# Patient Record
Sex: MALE | Race: WHITE | Employment: UNEMPLOYED | URBAN - METROPOLITAN AREA
[De-identification: names, ages, dates, MRNs, and addresses within clinical notes are randomized per-mention and may not be internally consistent; named-entity substitution may affect disease eponyms.]

---

## 2024-08-07 ENCOUNTER — APPOINTMENT (OUTPATIENT)
Dept: GENERAL RADIOLOGY | Facility: HOSPITAL | Age: 29
End: 2024-08-07
Attending: STUDENT IN AN ORGANIZED HEALTH CARE EDUCATION/TRAINING PROGRAM
Payer: MEDICAID

## 2024-08-07 ENCOUNTER — HOSPITAL ENCOUNTER (EMERGENCY)
Facility: HOSPITAL | Age: 29
Discharge: HOME | End: 2024-08-07
Attending: STUDENT IN AN ORGANIZED HEALTH CARE EDUCATION/TRAINING PROGRAM
Payer: MEDICAID

## 2024-08-07 VITALS
RESPIRATION RATE: 18 BRPM | DIASTOLIC BLOOD PRESSURE: 93 MMHG | TEMPERATURE: 98 F | HEART RATE: 67 BPM | WEIGHT: 205 LBS | OXYGEN SATURATION: 99 % | SYSTOLIC BLOOD PRESSURE: 145 MMHG

## 2024-08-07 DIAGNOSIS — S42.001A CLOSED DISPLACED FRACTURE OF RIGHT CLAVICLE, UNSPECIFIED PART OF CLAVICLE, INITIAL ENCOUNTER: Primary | ICD-10-CM

## 2024-08-07 PROCEDURE — 73000 X-RAY EXAM OF COLLAR BONE: CPT | Performed by: STUDENT IN AN ORGANIZED HEALTH CARE EDUCATION/TRAINING PROGRAM

## 2024-08-07 PROCEDURE — 99284 EMERGENCY DEPT VISIT MOD MDM: CPT

## 2024-08-07 RX ORDER — HYDROCODONE BITARTRATE AND ACETAMINOPHEN 5; 325 MG/1; MG/1
1-2 TABLET ORAL EVERY 6 HOURS PRN
Qty: 10 TABLET | Refills: 0 | Status: SHIPPED | OUTPATIENT
Start: 2024-08-07 | End: 2024-08-09

## 2024-08-07 RX ORDER — HYDROCODONE BITARTRATE AND ACETAMINOPHEN 5; 325 MG/1; MG/1
1 TABLET ORAL ONCE
Status: COMPLETED | OUTPATIENT
Start: 2024-08-07 | End: 2024-08-07

## 2024-08-07 NOTE — DISCHARGE INSTRUCTIONS
Follow-up care  Follow up with your healthcare provider as advised. This is to be sure the bone is healing the way it should.   X-rays are occasionally taken of the fracture. You'll be told of any new findings that may affect your care.   When to get medical care  Call your healthcare provider right away if any of these occur:  Swelling in your collarbone gets worse or the skin in the area becomes pale or discolored  Large area of bruising over the collarbone  Fingers become swollen, cold, blue, numb, or tingly  Shortness of breath, dizziness, or general weakness  Weakness or swelling in your arm  Any redness, drainage, or pus coming from the wound
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
## 2024-08-08 ENCOUNTER — HOSPITAL ENCOUNTER (EMERGENCY)
Facility: HOSPITAL | Age: 29
Discharge: LEFT BEFORE DISCHARGE | End: 2024-08-08
Payer: MEDICAID

## 2024-08-08 ENCOUNTER — TELEPHONE (OUTPATIENT)
Dept: ORTHOPEDICS CLINIC | Facility: CLINIC | Age: 29
End: 2024-08-08

## 2024-08-08 ENCOUNTER — OFFICE VISIT (OUTPATIENT)
Dept: ORTHOPEDICS CLINIC | Facility: CLINIC | Age: 29
End: 2024-08-08
Payer: MEDICAID

## 2024-08-08 ENCOUNTER — HOSPITAL ENCOUNTER (OUTPATIENT)
Dept: GENERAL RADIOLOGY | Age: 29
Discharge: HOME | End: 2024-08-08
Attending: FAMILY MEDICINE
Payer: MEDICAID

## 2024-08-08 VITALS
SYSTOLIC BLOOD PRESSURE: 151 MMHG | DIASTOLIC BLOOD PRESSURE: 103 MMHG | RESPIRATION RATE: 16 BRPM | OXYGEN SATURATION: 96 % | HEART RATE: 61 BPM | TEMPERATURE: 98 F

## 2024-08-08 VITALS — WEIGHT: 205 LBS | BODY MASS INDEX: 25.49 KG/M2 | HEIGHT: 75 IN

## 2024-08-08 DIAGNOSIS — S42.021A CLOSED DISPLACED FRACTURE OF SHAFT OF RIGHT CLAVICLE, INITIAL ENCOUNTER: Primary | ICD-10-CM

## 2024-08-08 DIAGNOSIS — M89.8X1 PAIN OF RIGHT CLAVICLE: ICD-10-CM

## 2024-08-08 DIAGNOSIS — M89.8X1 PAIN OF RIGHT CLAVICLE: Primary | ICD-10-CM

## 2024-08-08 PROCEDURE — 73000 X-RAY EXAM OF COLLAR BONE: CPT | Performed by: FAMILY MEDICINE

## 2024-08-08 RX ORDER — ALBUTEROL SULFATE 90 UG/1
1 AEROSOL, METERED RESPIRATORY (INHALATION) EVERY 6 HOURS PRN
COMMUNITY

## 2024-08-08 RX ORDER — HYDROCODONE BITARTRATE AND ACETAMINOPHEN 2.5; 108 MG/5ML; MG/5ML
SOLUTION ORAL AS DIRECTED
COMMUNITY
End: 2024-08-12

## 2024-08-08 RX ORDER — PREDNISONE 20 MG/1
40 TABLET ORAL DAILY
Qty: 14 TABLET | Refills: 0 | Status: SHIPPED | OUTPATIENT
Start: 2024-08-08 | End: 2024-08-15
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
## 2024-08-08 NOTE — TELEPHONE ENCOUNTER
Do you want new images for appointment 8/14/24?  Please advise. Thank you!    DOI 8/7/24    XR 8/7/24    Impression   CONCLUSION:     Mildly displaced midclavicular fracture with up to 1.9 cm of displacement.  The acromioclavicular joint is intact.     The remaining osseous structures are unremarkable.     Visualized portions of the lungs are unremarkable.

## 2024-08-08 NOTE — CM/SW NOTE
Mother called for assistance with follow up appointment  On AVS states to call Dr Goodman for follow up  Per mom she called Dr Haro  Office stated \"Dr Thibodeaux was not on call on 8/7 when patient was seen in the Er and we will not take any information\"    Called Dr Beherys office they will message the clinical staff to see if Dr Behery can see the patient  Dr Behery is out of network with insurance    Called Dr Ryan office they will call the clinical team to see if they can get sooner appointment    Called [facility] [phone] appt made with Dr Wright 8/14 at 3:30    Called Kelly Huntley patient needs referral from primary care MD, per mom patient has not seen a PCP, [place]    Mom called insurance and received closer PCP tried to get appointment for referral but mom states would have to wait 4 hours in the office to see provider. Mom states patient is in excruciating pain and arm is turning numb  Mom will bring patient back to the Er to be reevaluated by an ERMD    Patient in Er triage and mom received called from DR Kyle erickson who stated for patient to come to office at this time    Mom and ruelt left to go to Dr Wright's office

## 2024-08-08 NOTE — TELEPHONE ENCOUNTER
Per Kisha patient has a right clavicle fracture and asking if patient can be seen sooner than first available 8/19. Per Kisha please call patient at [phone] to schedule. Thank you
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
## 2024-08-08 NOTE — TELEPHONE ENCOUNTER
Spoke with patient who transferred me to his mom. I let her know that he had an appointment scheduled on 8/14 in [place] with [facility]. We have nothing until 8/15 open and advised to call around to see if patient can be seen sooner than next Wednesday. Gave number for Augustine. Advised to call back if cannot get in sooner and to call to cancel appointment scheduled with [facility] if they can
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
## 2024-08-09 ENCOUNTER — OFFICE VISIT (OUTPATIENT)
Dept: ORTHOPEDICS CLINIC | Facility: CLINIC | Age: 29
End: 2024-08-09
Payer: MEDICAID

## 2024-08-09 ENCOUNTER — TELEPHONE (OUTPATIENT)
Dept: ORTHOPEDICS CLINIC | Facility: CLINIC | Age: 29
End: 2024-08-09

## 2024-08-09 VITALS — HEIGHT: 75 IN | WEIGHT: 205 LBS | BODY MASS INDEX: 25.49 KG/M2

## 2024-08-09 DIAGNOSIS — S42.021A CLOSED DISPLACED FRACTURE OF SHAFT OF RIGHT CLAVICLE, INITIAL ENCOUNTER: Primary | ICD-10-CM

## 2024-08-09 DIAGNOSIS — S42.001A CLOSED DISPLACED FRACTURE OF RIGHT CLAVICLE, UNSPECIFIED PART OF CLAVICLE, INITIAL ENCOUNTER: ICD-10-CM

## 2024-08-09 RX ORDER — HYDROCODONE BITARTRATE AND ACETAMINOPHEN 5; 325 MG/1; MG/1
1-2 TABLET ORAL EVERY 6 HOURS PRN
Qty: 15 TABLET | Refills: 0 | Status: SHIPPED | OUTPATIENT
Start: 2024-08-09 | End: 2024-08-14

## 2024-08-09 NOTE — PROGRESS NOTES
Future Appointments   Date Time Provider Department Center   8/9/2024 11:20 AM [email or web address]
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
## 2024-08-09 NOTE — TELEPHONE ENCOUNTER
Spoke with Mariah to let lucio know that  the prior auth was sent this morning at 11:53 and we just got a notification at 1:46 the the prior auth has begun.

## 2024-08-09 NOTE — TELEPHONE ENCOUNTER
Patient's Mother Mariah called to request a prior auth for the [place] [facility]. Received the fax.

## 2024-08-12 ENCOUNTER — TELEPHONE (OUTPATIENT)
Dept: ORTHOPEDICS CLINIC | Facility: CLINIC | Age: 29
End: 2024-08-12

## 2024-08-12 ENCOUNTER — ANESTHESIA EVENT (OUTPATIENT)
Dept: SURGERY | Facility: HOSPITAL | Age: 29
End: 2024-08-12
Payer: MEDICAID

## 2024-08-12 ENCOUNTER — OFFICE VISIT (OUTPATIENT)
Dept: ORTHOPEDICS CLINIC | Facility: CLINIC | Age: 29
End: 2024-08-12
Payer: MEDICAID

## 2024-08-12 VITALS
DIASTOLIC BLOOD PRESSURE: 81 MMHG | HEART RATE: 58 BPM | BODY MASS INDEX: 25.49 KG/M2 | HEIGHT: 75 IN | WEIGHT: 205 LBS | SYSTOLIC BLOOD PRESSURE: 124 MMHG

## 2024-08-12 DIAGNOSIS — S42.021A DISPLACED FRACTURE OF SHAFT OF RIGHT CLAVICLE, INITIAL ENCOUNTER FOR CLOSED FRACTURE: Primary | ICD-10-CM

## 2024-08-12 DIAGNOSIS — S42.021A CLOSED DISPLACED FRACTURE OF SHAFT OF RIGHT CLAVICLE, INITIAL ENCOUNTER: Primary | ICD-10-CM
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
## 2024-08-12 NOTE — TELEPHONE ENCOUNTER
Date of Surgery: 8/13/24       Post Op Appt: 8/28/24 @ 1240    Case ID: [id number]    Notes:       SURGERY SCHEDULING SHEET     Delmar Young  1/13/1995  [id number]     Procedure: Right ORIF clavicle (07151)     Diagnosis:  Closed displaced fracture of shaft of right clavicle, initial encounter [S42.021A]     Anesthesia: General     Length of Surgery: 2 hrs     Disposition: Outpatient     Special Equipment: Aakash ALPS clavicle plate      Positioning: soft beach chair normal bed      Assist: Yes SK PAANSELMO     Pre-op Testing: PER ANESTHESIA GUIDELINES     Clearance: HISTORY AND PHYSICAL      Post op: 2 weeks post op     Anna Erickson MD  [facility] Orthopedic Surgery  Phone: [phone]  Fax: [phone]
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
## 2024-08-12 NOTE — TELEPHONE ENCOUNTER
SURGERY SCHEDULING SHEET    Delmar Young  1/13/1995  [id number]    Procedure: Right ORIF clavicle (26378)    Diagnosis:  Closed displaced fracture of shaft of right clavicle, initial encounter [S42.021A]    Anesthesia: General    Length of Surgery: 2 hrs    Disposition: Outpatient    Special Equipment: Aakash ALPS clavicle plate     Positioning: soft beach chair normal bed     Assist: Yes MARIEL DASH    Pre-op Testing: PER ANESTHESIA GUIDELINES    Clearance: HISTORY AND PHYSICAL     Post op: 2 weeks post op    Anna Erickson MD  [facility] Orthopedic Surgery  Phone: [phone]  Fax: [phone]

## 2024-08-13 ENCOUNTER — HOSPITAL ENCOUNTER (OUTPATIENT)
Facility: HOSPITAL | Age: 29
Setting detail: HOSPITAL OUTPATIENT SURGERY
Discharge: HOME | End: 2024-08-13
Attending: ORTHOPAEDIC SURGERY | Admitting: ORTHOPAEDIC SURGERY
Payer: MEDICAID

## 2024-08-13 ENCOUNTER — APPOINTMENT (OUTPATIENT)
Dept: GENERAL RADIOLOGY | Facility: HOSPITAL | Age: 29
End: 2024-08-13
Attending: ORTHOPAEDIC SURGERY
Payer: MEDICAID

## 2024-08-13 ENCOUNTER — ANESTHESIA (OUTPATIENT)
Dept: SURGERY | Facility: HOSPITAL | Age: 29
End: 2024-08-13
Payer: MEDICAID

## 2024-08-13 VITALS
HEIGHT: 75 IN | WEIGHT: 198 LBS | BODY MASS INDEX: 24.62 KG/M2 | RESPIRATION RATE: 16 BRPM | DIASTOLIC BLOOD PRESSURE: 85 MMHG | SYSTOLIC BLOOD PRESSURE: 132 MMHG | OXYGEN SATURATION: 100 % | TEMPERATURE: 98 F | HEART RATE: 77 BPM

## 2024-08-13 DIAGNOSIS — S42.021A CLOSED DISPLACED FRACTURE OF SHAFT OF RIGHT CLAVICLE, INITIAL ENCOUNTER: Primary | ICD-10-CM

## 2024-08-13 PROCEDURE — 0PS904Z REPOSITION RIGHT CLAVICLE WITH INTERNAL FIXATION DEVICE, OPEN APPROACH: ICD-10-PCS | Performed by: ORTHOPAEDIC SURGERY

## 2024-08-13 PROCEDURE — 76000 FLUOROSCOPY <1 HR PHYS/QHP: CPT | Performed by: ORTHOPAEDIC SURGERY

## 2024-08-13 DEVICE — IMPLANTABLE DEVICE: Type: IMPLANTABLE DEVICE | Site: CLAVICLE | Status: FUNCTIONAL

## 2024-08-13 DEVICE — IMPLANTABLE DEVICE: Type: IMPLANTABLE DEVICE | Site: CLAVICLE

## 2024-08-13 RX ORDER — SCOLOPAMINE TRANSDERMAL SYSTEM 1 MG/1
1 PATCH, EXTENDED RELEASE TRANSDERMAL ONCE
Status: DISCONTINUED | OUTPATIENT
Start: 2024-08-13 | End: 2024-08-13 | Stop reason: HOSPADM

## 2024-08-13 RX ORDER — GLYCOPYRROLATE 0.2 MG/ML
INJECTION, SOLUTION INTRAMUSCULAR; INTRAVENOUS AS NEEDED
Status: DISCONTINUED | OUTPATIENT
Start: 2024-08-13 | End: 2024-08-13 | Stop reason: SURG

## 2024-08-13 RX ORDER — ACETAMINOPHEN 500 MG
1000 TABLET ORAL ONCE AS NEEDED
Status: COMPLETED | OUTPATIENT
Start: 2024-08-13 | End: 2024-08-13

## 2024-08-13 RX ORDER — HYDRALAZINE HYDROCHLORIDE 20 MG/ML
INJECTION INTRAMUSCULAR; INTRAVENOUS AS NEEDED
Status: DISCONTINUED | OUTPATIENT
Start: 2024-08-13 | End: 2024-08-13 | Stop reason: SURG

## 2024-08-13 RX ORDER — SODIUM CHLORIDE, SODIUM LACTATE, POTASSIUM CHLORIDE, CALCIUM CHLORIDE 600; 310; 30; 20 MG/100ML; MG/100ML; MG/100ML; MG/100ML
INJECTION, SOLUTION INTRAVENOUS CONTINUOUS
Status: DISCONTINUED | OUTPATIENT
Start: 2024-08-13 | End: 2024-08-13

## 2024-08-13 RX ORDER — HYDROCODONE BITARTRATE AND ACETAMINOPHEN 5; 325 MG/1; MG/1
1 TABLET ORAL
Qty: 30 TABLET | Refills: 0 | Status: SHIPPED | OUTPATIENT
Start: 2024-08-13 | End: 2024-08-14

## 2024-08-13 RX ORDER — NEOSTIGMINE METHYLSULFATE 1 MG/ML
INJECTION INTRAVENOUS AS NEEDED
Status: DISCONTINUED | OUTPATIENT
Start: 2024-08-13 | End: 2024-08-13 | Stop reason: SURG

## 2024-08-13 RX ORDER — HYDROCODONE BITARTRATE AND ACETAMINOPHEN 5; 325 MG/1; MG/1
TABLET ORAL
Status: COMPLETED
Start: 2024-08-13 | End: 2024-08-13

## 2024-08-13 RX ORDER — HYDROCODONE BITARTRATE AND ACETAMINOPHEN 5; 325 MG/1; MG/1
2 TABLET ORAL ONCE AS NEEDED
Status: COMPLETED | OUTPATIENT
Start: 2024-08-13 | End: 2024-08-13

## 2024-08-13 RX ORDER — ACETAMINOPHEN 500 MG
1000 TABLET ORAL ONCE
Status: DISCONTINUED | OUTPATIENT
Start: 2024-08-13 | End: 2024-08-13 | Stop reason: HOSPADM

## 2024-08-13 RX ORDER — ONDANSETRON 2 MG/ML
INJECTION INTRAMUSCULAR; INTRAVENOUS
Status: DISCONTINUED
Start: 2024-08-13 | End: 2024-08-13 | Stop reason: WASHOUT

## 2024-08-13 RX ORDER — PROCHLORPERAZINE EDISYLATE 5 MG/ML
5 INJECTION INTRAMUSCULAR; INTRAVENOUS EVERY 8 HOURS PRN
Status: DISCONTINUED | OUTPATIENT
Start: 2024-08-13 | End: 2024-08-13

## 2024-08-13 RX ORDER — LIDOCAINE HYDROCHLORIDE 10 MG/ML
INJECTION, SOLUTION EPIDURAL; INFILTRATION; INTRACAUDAL; PERINEURAL AS NEEDED
Status: DISCONTINUED | OUTPATIENT
Start: 2024-08-13 | End: 2024-08-13 | Stop reason: SURG

## 2024-08-13 RX ORDER — MIDAZOLAM HYDROCHLORIDE 1 MG/ML
INJECTION INTRAMUSCULAR; INTRAVENOUS AS NEEDED
Status: DISCONTINUED | OUTPATIENT
Start: 2024-08-13 | End: 2024-08-13 | Stop reason: SURG

## 2024-08-13 RX ORDER — HYDROMORPHONE HYDROCHLORIDE 1 MG/ML
INJECTION, SOLUTION INTRAMUSCULAR; INTRAVENOUS; SUBCUTANEOUS
Status: COMPLETED
Start: 2024-08-13 | End: 2024-08-13

## 2024-08-13 RX ORDER — ONDANSETRON 2 MG/ML
INJECTION INTRAMUSCULAR; INTRAVENOUS AS NEEDED
Status: DISCONTINUED | OUTPATIENT
Start: 2024-08-13 | End: 2024-08-13 | Stop reason: SURG

## 2024-08-13 RX ORDER — LABETALOL HYDROCHLORIDE 5 MG/ML
5 INJECTION, SOLUTION INTRAVENOUS EVERY 5 MIN PRN
Status: DISCONTINUED | OUTPATIENT
Start: 2024-08-13 | End: 2024-08-13

## 2024-08-13 RX ORDER — ONDANSETRON 2 MG/ML
4 INJECTION INTRAMUSCULAR; INTRAVENOUS EVERY 6 HOURS PRN
Status: DISCONTINUED | OUTPATIENT
Start: 2024-08-13 | End: 2024-08-13

## 2024-08-13 RX ORDER — HYDROMORPHONE HYDROCHLORIDE 1 MG/ML
0.6 INJECTION, SOLUTION INTRAMUSCULAR; INTRAVENOUS; SUBCUTANEOUS EVERY 5 MIN PRN
Status: DISCONTINUED | OUTPATIENT
Start: 2024-08-13 | End: 2024-08-13

## 2024-08-13 RX ORDER — HYDROMORPHONE HYDROCHLORIDE 1 MG/ML
0.2 INJECTION, SOLUTION INTRAMUSCULAR; INTRAVENOUS; SUBCUTANEOUS EVERY 5 MIN PRN
Status: DISCONTINUED | OUTPATIENT
Start: 2024-08-13 | End: 2024-08-13

## 2024-08-13 RX ORDER — ROCURONIUM BROMIDE 10 MG/ML
INJECTION, SOLUTION INTRAVENOUS AS NEEDED
Status: DISCONTINUED | OUTPATIENT
Start: 2024-08-13 | End: 2024-08-13 | Stop reason: SURG

## 2024-08-13 RX ORDER — HYDROCODONE BITARTRATE AND ACETAMINOPHEN 5; 325 MG/1; MG/1
1 TABLET ORAL ONCE AS NEEDED
Status: COMPLETED | OUTPATIENT
Start: 2024-08-13 | End: 2024-08-13

## 2024-08-13 RX ORDER — NALOXONE HYDROCHLORIDE 0.4 MG/ML
0.08 INJECTION, SOLUTION INTRAMUSCULAR; INTRAVENOUS; SUBCUTANEOUS AS NEEDED
Status: DISCONTINUED | OUTPATIENT
Start: 2024-08-13 | End: 2024-08-13

## 2024-08-13 RX ORDER — BUPIVACAINE HYDROCHLORIDE 2.5 MG/ML
INJECTION, SOLUTION EPIDURAL; INFILTRATION; INTRACAUDAL AS NEEDED
Status: DISCONTINUED | OUTPATIENT
Start: 2024-08-13 | End: 2024-08-13 | Stop reason: HOSPADM

## 2024-08-13 RX ORDER — HYDROMORPHONE HYDROCHLORIDE 1 MG/ML
0.4 INJECTION, SOLUTION INTRAMUSCULAR; INTRAVENOUS; SUBCUTANEOUS EVERY 5 MIN PRN
Status: DISCONTINUED | OUTPATIENT
Start: 2024-08-13 | End: 2024-08-13

## 2024-08-13 RX ORDER — TRANEXAMIC ACID 10 MG/ML
INJECTION, SOLUTION INTRAVENOUS AS NEEDED
Status: DISCONTINUED | OUTPATIENT
Start: 2024-08-13 | End: 2024-08-13 | Stop reason: SURG

## 2024-08-13 RX ADMIN — SODIUM CHLORIDE, SODIUM LACTATE, POTASSIUM CHLORIDE, CALCIUM CHLORIDE: 600; 310; 30; 20 INJECTION, SOLUTION INTRAVENOUS at 10:51:00

## 2024-08-13 RX ADMIN — SODIUM CHLORIDE, SODIUM LACTATE, POTASSIUM CHLORIDE, CALCIUM CHLORIDE: 600; 310; 30; 20 INJECTION, SOLUTION INTRAVENOUS at 08:34:00

## 2024-08-13 RX ADMIN — ROCURONIUM BROMIDE 10 MG: 10 INJECTION, SOLUTION INTRAVENOUS at 11:22:00

## 2024-08-13 RX ADMIN — TRANEXAMIC ACID 1000 MG: 10 INJECTION, SOLUTION INTRAVENOUS at 09:52:00

## 2024-08-13 RX ADMIN — SODIUM CHLORIDE, SODIUM LACTATE, POTASSIUM CHLORIDE, CALCIUM CHLORIDE: 600; 310; 30; 20 INJECTION, SOLUTION INTRAVENOUS at 12:20:00

## 2024-08-13 RX ADMIN — GLYCOPYRROLATE 0.8 MG: 0.2 INJECTION, SOLUTION INTRAMUSCULAR; INTRAVENOUS at 12:03:00

## 2024-08-13 RX ADMIN — HYDRALAZINE HYDROCHLORIDE 5 MG: 20 INJECTION INTRAMUSCULAR; INTRAVENOUS at 10:03:00

## 2024-08-13 RX ADMIN — HYDRALAZINE HYDROCHLORIDE 5 MG: 20 INJECTION INTRAMUSCULAR; INTRAVENOUS at 09:56:00

## 2024-08-13 RX ADMIN — ROCURONIUM BROMIDE 10 MG: 10 INJECTION, SOLUTION INTRAVENOUS at 10:10:00

## 2024-08-13 RX ADMIN — ROCURONIUM BROMIDE 10 MG: 10 INJECTION, SOLUTION INTRAVENOUS at 10:51:00

## 2024-08-13 RX ADMIN — MIDAZOLAM HYDROCHLORIDE 2 MG: 1 INJECTION INTRAMUSCULAR; INTRAVENOUS at 08:38:00

## 2024-08-13 RX ADMIN — ROCURONIUM BROMIDE 50 MG: 10 INJECTION, SOLUTION INTRAVENOUS at 08:48:00

## 2024-08-13 RX ADMIN — NEOSTIGMINE METHYLSULFATE 4 MG: 1 INJECTION INTRAVENOUS at 12:03:00

## 2024-08-13 RX ADMIN — LIDOCAINE HYDROCHLORIDE 50 MG: 10 INJECTION, SOLUTION EPIDURAL; INFILTRATION; INTRACAUDAL; PERINEURAL at 08:46:00

## 2024-08-13 RX ADMIN — ROCURONIUM BROMIDE 20 MG: 10 INJECTION, SOLUTION INTRAVENOUS at 10:27:00

## 2024-08-13 RX ADMIN — ONDANSETRON 4 MG: 2 INJECTION INTRAMUSCULAR; INTRAVENOUS at 11:34:00
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
## 2024-08-13 NOTE — ANESTHESIA PREPROCEDURE EVALUATION
PRE-OP EVALUATION    Patient Name: Delmar Young    Admit Diagnosis: Closed displaced fracture of shaft of right clavicle, initial encounter [S42.021A]    Pre-op Diagnosis: Closed displaced fracture of shaft of right clavicle, initial encounter [S42.021A]    OPEN REDUCTION INTERNAL FIXATION OF RIGHT CLAVICLE    Anesthesia Procedure: OPEN REDUCTION INTERNAL FIXATION OF RIGHT CLAVICLE (Right)    Surgeons and Role:     * Anna Erickson MD - Primary    Pre-op vitals reviewed.  Temp: 97.8 °F (36.6 °C)  Pulse: 53  Resp: 16  BP: 106/56  SpO2: 97 %  Body mass index is 24.75 kg/m².    Current medications reviewed.  Hospital Medications:   [Transfer Hold] acetaminophen (Tylenol Extra Strength) tab 1,000 mg  1,000 mg Oral Once    [Transfer Hold] scopolamine (Transderm-Scop) 1 MG/3DAYS patch 1 patch  1 patch Transdermal Once    lactated ringers infusion   Intravenous Continuous    ceFAZolin (Ancef) 2g in 10mL IV syringe premix  2 g Intravenous Once       Outpatient Medications:     Medications Prior to Admission   Medication Sig Dispense Refill Last Dose    HYDROcodone-acetaminophen 5-325 MG Oral Tab Take 1-2 tablets by mouth every 6 (six) hours as needed for Pain. 15 tablet 0 8/12/2024    predniSONE 20 MG Oral Tab Take 2 tablets (40 mg total) by mouth daily for 7 days. 14 tablet 0 Past Week    albuterol 108 (90 Base) MCG/ACT Inhalation Aero Soln Inhale 1 puff into the lungs every 6 (six) hours as needed.   More than a month       Allergies: Patient has no known allergies.      Anesthesia Evaluation        Anesthetic Complications           GI/Hepatic/Renal                                 Cardiovascular    Negative cardiovascular ROS.        MET: >4                                           Endo/Other                                  Pulmonary    Negative pulmonary ROS.                       Neuro/Psych                                      History reviewed. No pertinent surgical history.  Social History     Socioeconomic  History    Marital status: Single   Tobacco Use    Smoking status: Never    Smokeless tobacco: Never   Vaping Use    Vaping status: Never Used   Substance and Sexual Activity    Alcohol use: Never    Drug use: Yes     Types: Cannabis     Comment: SMOKE 1/DAY     History   Drug Use    Types: Cannabis     Comment: SMOKE 1/DAY     Available pre-op labs reviewed.               Airway      Mallampati: II  Mouth opening: 3 FB  TM distance: 4 - 6 cm  Neck ROM: limited Cardiovascular    Cardiovascular exam normal.         Dental    Dentition appears grossly intact         Pulmonary    Pulmonary exam normal.                 Other findings              ASA: 2   Plan: general  NPO status verified and     Post-procedure pain management plan discussed with surgeon and patient.      Plan/risks discussed with: patient            Limited ROM due to pain (sharp at the clavicle level when extending and lateral rotating the neck. Will Plan for GA and use glidescope to avoid any neck extension.   Plan for GA Risks/benefits/alternatives were discussed and the consent was signed. All questions answered    Present on Admission:  **None**

## 2024-08-13 NOTE — INTERVAL H&P NOTE
Pre-op Diagnosis: Closed displaced fracture of shaft of right clavicle, initial encounter [S42.021A]    The above referenced H&P was reviewed by Anna Erickson MD on 8/13/2024, the patient was examined and no significant changes have occurred in the patient's condition since the H&P was performed.  I discussed with the patient and/or legal representative the potential benefits, risks and side effects of this procedure; the likelihood of the patient achieving goals; and potential problems that might occur during recuperation.  I discussed reasonable alternatives to the procedure, including risks, benefits and side effects related to the alternatives and risks related to not receiving this procedure.  We will proceed with procedure as planned.

## 2024-08-13 NOTE — BRIEF OP NOTE
Pre-Operative Diagnosis: Closed displaced fracture of shaft of right clavicle, initial encounter [S42.021A]     Post-Operative Diagnosis: Closed displaced fracture of shaft of right clavicle, initial encounter [S42.021A]      Procedure Performed:   OPEN REDUCTION INTERNAL FIXATION OF RIGHT CLAVICLE    Surgeons and Role:     * Anna Erickson MD - Primary    Assistant(s):  PA: Lesa Mccall PA-C     Surgical Findings: see operative report     Specimen: na      Estimated Blood Loss: Blood Output: 50 mL (8/13/2024 11:55 AM)      Lesa Mccall PA-C  8/13/2024  12:21 PM

## 2024-08-13 NOTE — DISCHARGE INSTRUCTIONS
Post-operative Instructions    Medication: Before you leave the hospital, you will be given a prescription for a pain reliever. This medication contains a narcotic with acetaminophen (Tylenol), so do not take any additional Tylenol.    You may take Tylenol or Ibuprofen instead of the prescription as the pain subsides. If you take a daily Aspirin you may resume taking your Aspirin 24 hours after surgery.      Day of Surgery: When you return home, sleeping upright (such as in a recliner) may be more comfortable for the first few days.        Dressings: The dressing should remain in place for 5 days.  Then remove the dressing and inspect the incision. Contact our office with any redness or drainage from the incision.  After the initial dressing is removed, continue with daily nonstick dressing changes.        Showering: Do not get the incision/dressing wet.        Activity:   Flex and extend the hand, wrist, and elbow frequently to avoid stiffness.        Follow up: You will be scheduled for a follow up office visit in 14 days for recheck. Your plan for physical therapy, driving, and returning to work will be discussed at this appointment.     Please don't hesitate to contact our office at [phone] if you have any post-operative questions or concerns.

## 2024-08-13 NOTE — ANESTHESIA POSTPROCEDURE EVALUATION
[facility]    Delmar Young Patient Status:  Hospital Outpatient Surgery   Age/Gender 29 year old male MRN [id number]   Location [facility] POST ANESTHESIA CARE UNIT Attending nAna Erickson MD   Hosp Day # 0 PCP None Pcp       Anesthesia Post-op Note    OPEN REDUCTION INTERNAL FIXATION OF RIGHT CLAVICLE    Procedure Summary       Date: 08/13/24 Room / Location:  MAIN OR 05 /  MAIN OR    Anesthesia Start: 0834 Anesthesia Stop: 1220    Procedure: OPEN REDUCTION INTERNAL FIXATION OF RIGHT CLAVICLE (Right: Shoulder) Diagnosis:       Closed displaced fracture of shaft of right clavicle, initial encounter      (Closed displaced fracture of shaft of right clavicle, initial encounter [S42.021A])    Surgeons: Anna Erickson MD Anesthesiologist: Summer Perrin MD    Anesthesia Type: general ASA Status: 2            Anesthesia Type: general    Vitals Value Taken Time   /97 08/13/24 1239   Temp 97.8 °F (36.6 °C) 08/13/24 1220   Pulse 79 08/13/24 1239   Resp 17 08/13/24 1239   SpO2 99 % 08/13/24 1239   Vitals shown include unfiled device data.    Patient Location: PACU    Anesthesia Type: general    Airway Patency: patent    Postop Pain Control: adequate    Mental Status: preanesthetic baseline    Nausea/Vomiting: none    Cardiopulmonary/Hydration status: stable euvolemic    Complications: no apparent anesthesia related complications    Postop vital signs: stable    Dental Exam: Unchanged from Preop    Patient to be discharged from PACU when criteria met.

## 2024-08-13 NOTE — ANESTHESIA PROCEDURE NOTES
Airway  Date/Time: 8/13/2024 8:50 AM  Urgency: elective      General Information and Staff    Patient location during procedure: OR  Anesthesiologist: Summer Perrin MD  Performed: anesthesiologist   Performed by: Summer Perrin MD  Authorized by: Summer Perrin MD      Indications and Patient Condition  Indications for airway management: anesthesia  Sedation level: deep  Preoxygenated: yes  Patient position: sniffing  Mask difficulty assessment: 1 - vent by mask    Final Airway Details  Final airway type: endotracheal airway      Successful airway: ETT  Cuffed: yes   Successful intubation technique: direct laryngoscopy  Endotracheal tube insertion site: oral  Blade: GlideScope  Blade size: #4  ETT size (mm): 7.5    Cormack-Lehane Classification: grade I - full view of glottis  Placement verified by: capnometry   Measured from: lips  ETT to lips (cm): 21  Number of attempts at approach: 1    Additional Comments  Used the glidescope to avoid any c spine manipulation. C spine was not manipulated for ventilation or intubation. In line stabilization was applied

## 2024-08-14 DIAGNOSIS — S42.021A CLOSED DISPLACED FRACTURE OF SHAFT OF RIGHT CLAVICLE, INITIAL ENCOUNTER: ICD-10-CM

## 2024-08-14 RX ORDER — HYDROCODONE BITARTRATE AND ACETAMINOPHEN 5; 325 MG/1; MG/1
1 TABLET ORAL
Qty: 30 TABLET | Refills: 0 | Status: SHIPPED | OUTPATIENT
Start: 2024-08-14

## 2024-08-14 NOTE — TELEPHONE ENCOUNTER
Mom (Mariah, on HIPAA) called on son's behalf. Surgery yesterday 8/13/24.  Continuing to have difficulty with [facility] pharmacy.   PA denied. [facility] will not dispense under [facility] and cost more than double out of pocket compared to [facility].  Unable to  the norco rx ordered yesterday 8/13/24.  Requesting it be re-ordered to [facility] on file. Pended.    To call pt or mom when ordered so she can  shortly after.

## 2024-08-15 ENCOUNTER — TELEPHONE (OUTPATIENT)
Facility: CLINIC | Age: 29
End: 2024-08-15

## 2024-08-15 NOTE — TELEPHONE ENCOUNTER
Spoke with patient who stated he did  the Norco about an hour ago and doesn't plan on using it for to long. Discussed using ibuprofen and tylenol.  Patient stated he does not like taking medication at all.  He is doing so much better and very grateful for the care he has received. Patient verbalized to reach out with any questions or concerns.

## 2024-08-15 NOTE — TELEPHONE ENCOUNTER
[facility]

## 2024-08-15 NOTE — TELEPHONE ENCOUNTER
Lesa Mccall PA-C   to [facility] Clinical Pool         8/15/24  3:17 PM  I sent a new RX to [facility] as requested can we make sure with the patient that he received medication?  Thank you so much

## 2024-08-15 NOTE — TELEPHONE ENCOUNTER
Spoke with Joann pharmacy intern at [facility] who stated that they have to change the Norco order to 4 tabs per day (every 6 hrs) as required by his insurance.  Per [facility] protocol, they need to change it to a 5 day supply which is 20 tabs instead of 30 tabs as originally ordered, [place]

## 2024-08-28 ENCOUNTER — HOSPITAL ENCOUNTER (OUTPATIENT)
Dept: GENERAL RADIOLOGY | Age: 29
Discharge: HOME | End: 2024-08-28
Attending: PHYSICIAN ASSISTANT
Payer: MEDICAID

## 2024-08-28 ENCOUNTER — OFFICE VISIT (OUTPATIENT)
Dept: ORTHOPEDICS CLINIC | Facility: CLINIC | Age: 29
End: 2024-08-28
Payer: MEDICAID

## 2024-08-28 VITALS — WEIGHT: 198 LBS | HEIGHT: 75 IN | BODY MASS INDEX: 24.62 KG/M2

## 2024-08-28 DIAGNOSIS — S42.021A DISPLACED FRACTURE OF SHAFT OF RIGHT CLAVICLE, INITIAL ENCOUNTER FOR CLOSED FRACTURE: ICD-10-CM

## 2024-08-28 DIAGNOSIS — Z48.89 AFTERCARE FOLLOWING SURGERY: ICD-10-CM

## 2024-08-28 DIAGNOSIS — S42.021A DISPLACED FRACTURE OF SHAFT OF RIGHT CLAVICLE, INITIAL ENCOUNTER FOR CLOSED FRACTURE: Primary | ICD-10-CM

## 2024-08-28 PROCEDURE — 99024 POSTOP FOLLOW-UP VISIT: CPT | Performed by: PHYSICIAN ASSISTANT

## 2024-08-28 PROCEDURE — 73000 X-RAY EXAM OF COLLAR BONE: CPT | Performed by: PHYSICIAN ASSISTANT

## 2024-08-28 NOTE — PROGRESS NOTES
[facility] Post Op Progress Note    Date of Surgery: 08/13/2024      Subjective: This 29-year-old male presents today for follow up status post open reduction internal fixation of right clavicle.  He reports he has been feeling well and in minimal pain and discomfort after surgery.  He was taking Norco for the pain for the first week and reports this helped significantly, especially at night, but he has not needed it for the past several days.  He has been wearing the sling consistently and at night but taking it off a few times a day for bathing, grooming, and gentle elbow and wrist range of motion.  He admits to some numbness and tingling in the digits immediately following surgery but this has subsided.  He is off of work until February and motivated to allow full healing.  He does not have any other concerns and is here today for further evaluation.    Objective: He is a pleasant 29-year-old male in no acute distress.  Exam of the right upper extremity reveals there is a well-healing incision over the clavicle. Full elbow, wrist, and digit active range of motion. Sensation is present to light touch.    Imaging: I personally viewed, independently interpreted, and radiologically report read.  They show:  XR CLAVICLE, COMPLETE, RIGHT (CPT=73000)    Result Date: 8/28/2024  CONCLUSION:   Interval placement of fixation plate and screws along the superior aspect of the right mid/distal clavicle, transfixing an obliquely oriented fracture through the distal 3rd of the clavicle.  Osseous alignment is within normal limits.  Interval callus formation across the fracture plane, though with areas of persistent lucency indicating incomplete healing.   LOCATION:  [facility]   Dictated by (CST): Diana Decker MD on 8/28/2024 at 1:20 PM     Finalized by (CST): Diana Decker MD on 8/28/2024 at 1:22 PM         Assessment: Status post open reduction internal fixation of right clavicle      Plan: I reviewed x-ray and physical exam findings  with the patient which reveals osseous alignment within normal limits and some callus formation across the fracture.  We recommended continuing wearing the sling for the next 2 weeks and at night. He may take off the sling a couple times a day for bathing, grooming, eating and gentle range of motion exercises with the elbow, wrist, and digits to avoid stiffness.  We also discussed starting formal physical therapy focusing on passive flexion on the table, external rotation and internal rotation to tolerance, and elbow, wrist, and digit range of motion exercises.  He will follow-up with us in 2 weeks for repeat x-rays and at that time we will consider progressing with physical therapy.  He will follow-up sooner with any worsening symptoms or concerns.  All questions and concerns were answered and addressed the patient's satisfaction.    BERTHA Fernandez, PA-C Orthopedic Surgery   [address] [place] [id number]   t: [phone]  f: [phone]

## 2024-09-10 ENCOUNTER — TELEPHONE (OUTPATIENT)
Dept: ORTHOPEDICS CLINIC | Facility: CLINIC | Age: 29
End: 2024-09-10

## 2024-09-10 NOTE — TELEPHONE ENCOUNTER
XR ordered per ortho protocol. XR scheduled and patient was notified via [facility] to let them know that they should arrive 15-20 minutes early, in order for them to complete imaging.

## 2024-09-11 ENCOUNTER — OFFICE VISIT (OUTPATIENT)
Dept: ORTHOPEDICS CLINIC | Facility: CLINIC | Age: 29
End: 2024-09-11
Payer: MEDICAID

## 2024-09-11 ENCOUNTER — HOSPITAL ENCOUNTER (OUTPATIENT)
Dept: GENERAL RADIOLOGY | Age: 29
Discharge: HOME | End: 2024-09-11
Payer: MEDICAID

## 2024-09-11 VITALS — WEIGHT: 198 LBS | HEIGHT: 75 IN | BODY MASS INDEX: 24.62 KG/M2

## 2024-09-11 DIAGNOSIS — Z48.89 AFTERCARE FOLLOWING SURGERY: ICD-10-CM

## 2024-09-11 DIAGNOSIS — S42.021A DISPLACED FRACTURE OF SHAFT OF RIGHT CLAVICLE, INITIAL ENCOUNTER FOR CLOSED FRACTURE: ICD-10-CM

## 2024-09-11 DIAGNOSIS — Z48.89 AFTERCARE FOLLOWING SURGERY: Primary | ICD-10-CM

## 2024-09-11 PROCEDURE — 99024 POSTOP FOLLOW-UP VISIT: CPT | Performed by: PHYSICIAN ASSISTANT

## 2024-09-11 PROCEDURE — 73000 X-RAY EXAM OF COLLAR BONE: CPT

## 2024-09-11 NOTE — PROGRESS NOTES
[facility] Post Op Progress Note    Date of Surgery: 08/13/2024      Subjective: Delmar Young is a 29 year old male who is here for follow-up of his right clavicle.  He underwent open reduction internal fixation right clavicle fracture approximately 1 month ago.  He reports using the sling sparingly out of the house and has been working on range of motion exercises on his own.  He relates some mild elbow and shoulder stiffness but otherwise no pain.      Objective: Exam of the right clavicle and upper extremity reveals that the incision is well-healed.  He has near full extension of the elbow and full flexion.  He has 70 degrees of external rotation of the shoulder, passive elevation to 110 degrees.  Sensation is present to light touch.    Imaging: XR CLAVICLE, COMPLETE, RIGHT (CPT=73000)    Result Date: 9/11/2024  CONCLUSION:  Mid right clavicular fracture status post ORIF demonstrating interval healing.  Continued attention on follow-up is recommended.   LOCATION:  [place]   Dictated by (CST): Jeffery Travis MD on 9/11/2024 at 1:16 PM     Finalized by (CST): Jeffery Travis MD on 9/11/2024 at 1:17 PM            Assessment: Status post open reduction internal fixation right clavicle fracture, doing well      Plan: X-ray findings remain stable and this was discussed with the patient.  He will continue to wear the sling part-time out of the house only for the next 2 weeks then discontinue altogether.  He will work on passive elevation to tolerance as well as continue with external rotation.  He we will be careful to avoid reinjury especially with dogs at home.  Dr. Erickson also saw the patient and his agreement with the assessment and plan.  He discussed tobacco cessation with him that will improve healing.  He will follow-up in 1 month for recheck including x-rays of the right clavicle at that time.  He will follow-up sooner with questions, concerns, or worsening symptoms.      Lesa Mccall, [facility], PA-C Orthopedic  Surgery   [address] [place] [id number]   t: [phone]  f: [phone]           This document was partially prepared using Dragon Medical voice recognition software.  Although every attempt is made to correct errors during dictation, discrepancies may still exist. Please contact me with any questions or clarifications.

## 2024-10-08 ENCOUNTER — TELEPHONE (OUTPATIENT)
Dept: ORTHOPEDICS CLINIC | Facility: CLINIC | Age: 29
End: 2024-10-08

## 2024-10-08 NOTE — TELEPHONE ENCOUNTER
XR ordered per ortho protocol. XR scheduled and patient was notified via [employer] to let them know that they should arrive 15-20 minutes early, in order for them to complete imaging.

## (undated) DEVICE — SLEEVE COMPR MD KNEE LEN SGL USE KENDALL SCD

## (undated) DEVICE — Device

## (undated) DEVICE — UPPER EXTREMITY CDS-LF: Brand: MEDLINE INDUSTRIES, INC.

## (undated) DEVICE — ADHESIVE SKIN TOP FOR WND CLSR DERMBND ADV

## (undated) DEVICE — SUT MCRYL 4-0 18IN PS-2 ABSRB UD 19MM 3/8 CIR

## (undated) DEVICE — DRESSING ALG 3.5X10IN TAN CARBOXYMETHYL CELOS

## (undated) DEVICE — UNDYED BRAIDED (POLYGLACTIN 910), SYNTHETIC ABSORBABLE SUTURE: Brand: COATED VICRYL

## (undated) DEVICE — STANDARD HYPODERMIC NEEDLE,POLYPROPYLENE HUB: Brand: MONOJECT

## (undated) DEVICE — ZZ-DISC-SUB-422511-SUT VCRL 2-0 27IN FS-1 ABSRB UD L24MM 3/8 CIR

## (undated) DEVICE — C-ARM: Brand: UNBRANDED

## (undated) DEVICE — GOWN SUR 2XL LEV 4 BLU W/ WHT V NK BND AERO

## (undated) DEVICE — 3M™ IOBAN™ 2 ANTIMICROBIAL INCISE DRAPE 6650EZ: Brand: IOBAN™ 2

## (undated) DEVICE — GLOVE SUR 8.5 SENSICARE PI PIP GRN PWD F

## (undated) DEVICE — GLOVE SUR 6.5 SENSICARE PI PIP CRM PWD F

## (undated) DEVICE — SUT SUTTAPE L40IN BLU L36.6MM 1/2 CIR

## (undated) DEVICE — ANTIBACTERIAL UNDYED BRAIDED (POLYGLACTIN 910), SYNTHETIC ABSORBABLE SUTURE: Brand: COATED VICRYL

## (undated) DEVICE — INTENDED TO BE USED TO OCCLUDE, RETRACT AND IDENTIFY ARTERIES, VEINS, TENDONS AND NERVES IN SURGICAL PROCEDURES: Brand: STERION®  VESSEL LOOP

## (undated) DEVICE — GLOVE SUR 6.5 SENSICARE PI PIP GRN PWD F

## (undated) DEVICE — DRAPE,U/SHT,SPLIT,FILM,60X84,STERILE: Brand: MEDLINE

## (undated) DEVICE — SOLUTION IRRIG 1000ML 0.9% NACL USP BTL

## (undated) DEVICE — GLOVE SUR 8 SENSICARE PI PIP CRM PWD F

## (undated) NOTE — LETTER
24    Orthopedic Surgery   Pre-Operative Clearance Request    Patient Name:   Delmar Young             :   1995    Surgeon: Dr. Erickson             Date of Surgery: 24    Surgical Procedure: OPEN REDUCTION INTERNAL FIXATION OF RIGHT CLAVICLE      MUST COMPLETE ALL OF THE FOLLOWING 2-3 WEEKS PRIOR TO YOUR SURGERY TO AVOID CANCELLATION, DUE TO THE RULE THEIR WILL BE NO EXCEPTIONS!      [x]  History and Physical        [x]  Medical  Clearance                     Required pre-op testing to be ordered: N/A                                        **Please fax test results, H&P, and clearance to [phone] and to P.[facility] at [phone]**

## (undated) NOTE — LETTER
Date: 8/12/2024    Patient Name: Delmar Young          To Whom it may concern:      Please excuse Mariah Ramires from work due to her son's emergency surgery. She will need to provide assistance both before and after surgery. Please excuse her from work starting 8/9/24 through 8/19/24.        Sincerely,    Anna Erickson MD

## (undated) NOTE — LETTER
Date: 9/11/2024    Patient Name: Delmar Young          To Whom it may concern:    This letter has been written at the patient's request. The above patient was seen at [facility] for treatment of a medical condition.    Delmar was seen today in our office for follow up.  He continues to be unable to drive at this time.        Sincerely,    Lesa Mccall PA-C

## (undated) NOTE — Clinical Note
Lucas charles this young man just fractured his clavicle yesterday, skin tenting on exam and some neurologic symptoms I prescribed a steroid burst for - looks like he's added on for you tomorrow to discuss ORIF if you agree